# Patient Record
Sex: MALE
[De-identification: names, ages, dates, MRNs, and addresses within clinical notes are randomized per-mention and may not be internally consistent; named-entity substitution may affect disease eponyms.]

---

## 2024-09-17 ENCOUNTER — DASHBOARD ENCOUNTERS (OUTPATIENT)
Age: 44
End: 2024-09-17

## 2024-09-18 ENCOUNTER — OFFICE VISIT (OUTPATIENT)
Dept: URBAN - METROPOLITAN AREA CLINIC 113 | Facility: CLINIC | Age: 44
End: 2024-09-18

## 2024-09-18 NOTE — HPI-TODAY'S VISIT:
44-year-old with a history of COPD, peripheral neuropathy, gastroesophageal reflux disease, depression, previous hemicolectomy with ostomy referred by Mirian LEON for evaluation of frequent flatulence and defecation urgency.  A copy of this report will be sent to her office.  Blood work on 7/11/2024 revealed a hemoglobin 11.3, WBC 7.43 and platelet count 236,000.  MCV is 73.  Sodium 132 potassium 3.7 BUN 5 creatinine 0.8.

## 2024-09-20 ENCOUNTER — CLAIMS CREATED FROM THE CLAIM WINDOW (OUTPATIENT)
Dept: URBAN - METROPOLITAN AREA MEDICAL CENTER 19 | Facility: MEDICAL CENTER | Age: 44
End: 2024-09-20
Payer: COMMERCIAL

## 2024-09-20 DIAGNOSIS — K62.5 ANAL BLEEDING: ICD-10-CM

## 2024-09-20 DIAGNOSIS — Z90.49 H/O HEMICOLECTOMY: ICD-10-CM

## 2024-09-20 DIAGNOSIS — R74.01 ABNORMAL/ELEVATED TRANSAMINASE (SGOT, AMINOTRANSFERASE): ICD-10-CM

## 2024-09-20 DIAGNOSIS — Z93.3 COLOSTOMY STATUS: ICD-10-CM

## 2024-09-20 PROCEDURE — 99284 EMERGENCY DEPT VISIT MOD MDM: CPT | Performed by: INTERNAL MEDICINE

## 2024-09-20 PROCEDURE — 99254 IP/OBS CNSLTJ NEW/EST MOD 60: CPT | Performed by: INTERNAL MEDICINE

## 2024-10-16 ENCOUNTER — OFFICE VISIT (OUTPATIENT)
Dept: URBAN - METROPOLITAN AREA CLINIC 113 | Facility: CLINIC | Age: 44
End: 2024-10-16